# Patient Record
Sex: FEMALE | URBAN - METROPOLITAN AREA
[De-identification: names, ages, dates, MRNs, and addresses within clinical notes are randomized per-mention and may not be internally consistent; named-entity substitution may affect disease eponyms.]

---

## 2024-02-20 ENCOUNTER — LAB (OUTPATIENT)
Dept: URBAN - METROPOLITAN AREA MEDICAL CENTER 12 | Facility: MEDICAL CENTER | Age: 54
End: 2024-02-20
Payer: COMMERCIAL

## 2024-02-20 DIAGNOSIS — R10.84 ABDOMINAL CRAMPING, GENERALIZED: ICD-10-CM

## 2024-02-20 DIAGNOSIS — R11.2 ACUTE NAUSEA WITH NONBILIOUS VOMITING: ICD-10-CM

## 2024-02-20 PROCEDURE — 99254 IP/OBS CNSLTJ NEW/EST MOD 60: CPT | Performed by: INTERNAL MEDICINE

## 2024-02-20 PROCEDURE — 99204 OFFICE O/P NEW MOD 45 MIN: CPT | Performed by: INTERNAL MEDICINE

## 2024-02-21 ENCOUNTER — LAB (OUTPATIENT)
Dept: URBAN - METROPOLITAN AREA MEDICAL CENTER 12 | Facility: MEDICAL CENTER | Age: 54
End: 2024-02-21
Payer: COMMERCIAL

## 2024-02-21 DIAGNOSIS — R11.2 ACUTE NAUSEA WITH NONBILIOUS VOMITING: ICD-10-CM

## 2024-02-21 PROCEDURE — 43239 EGD BIOPSY SINGLE/MULTIPLE: CPT | Performed by: INTERNAL MEDICINE

## 2024-02-21 PROCEDURE — 43235 EGD DIAGNOSTIC BRUSH WASH: CPT | Performed by: INTERNAL MEDICINE

## 2024-03-22 ENCOUNTER — OV HOSPF/U (OUTPATIENT)
Dept: URBAN - METROPOLITAN AREA CLINIC 92 | Facility: CLINIC | Age: 54
End: 2024-03-22
Payer: COMMERCIAL

## 2024-03-22 ENCOUNTER — LAB (OUTPATIENT)
Dept: URBAN - METROPOLITAN AREA CLINIC 92 | Facility: CLINIC | Age: 54
End: 2024-03-22

## 2024-03-22 VITALS
WEIGHT: 275 LBS | TEMPERATURE: 97.3 F | HEART RATE: 81 BPM | SYSTOLIC BLOOD PRESSURE: 156 MMHG | HEIGHT: 67 IN | BODY MASS INDEX: 43.16 KG/M2 | DIASTOLIC BLOOD PRESSURE: 101 MMHG

## 2024-03-22 DIAGNOSIS — R10.13 EPIGASTRIC PAIN: ICD-10-CM

## 2024-03-22 DIAGNOSIS — R11.2 NAUSEA AND VOMITING IN ADULT: ICD-10-CM

## 2024-03-22 PROCEDURE — 99204 OFFICE O/P NEW MOD 45 MIN: CPT

## 2024-03-22 RX ORDER — HYDROCHLOROTHIAZIDE 25 MG/1
1 TABLET IN THE MORNING TABLET ORAL ONCE A DAY
Status: ACTIVE | COMMUNITY

## 2024-03-22 RX ORDER — METOPROLOL SUCCINATE 50 MG/1
1 TABLET TABLET, EXTENDED RELEASE ORAL ONCE A DAY
Status: ACTIVE | COMMUNITY

## 2024-03-22 RX ORDER — DICYCLOMINE HYDROCHLORIDE 20 MG/1
1 TABLET TABLET ORAL THREE TIMES A DAY
Qty: 90 | Refills: 0 | OUTPATIENT
Start: 2024-03-22 | End: 2024-04-21

## 2024-03-22 RX ORDER — DICYCLOMINE HYDROCHLORIDE 20 MG/1
1 TABLET TABLET ORAL THREE TIMES A DAY
Status: ACTIVE | COMMUNITY

## 2024-03-22 RX ORDER — TOPIRAMATE 25 MG/1
1 TABLET TABLET, FILM COATED ORAL ONCE A DAY
Status: ACTIVE | COMMUNITY

## 2024-03-22 RX ORDER — ONDANSETRON 8 MG/1
1 TABLET ON THE TONGUE AND ALLOW TO DISSOLVE  AS NEEDED TABLET, ORALLY DISINTEGRATING ORAL ONCE A DAY
Qty: 30 | OUTPATIENT
Start: 2024-03-22

## 2024-03-22 RX ORDER — ESCITALOPRAM OXALATE 20 MG/1
1 TABLET TABLET, FILM COATED ORAL ONCE A DAY
Status: ACTIVE | COMMUNITY

## 2024-03-22 RX ORDER — MUPIROCIN 20 MG/G
1 APPLICATION OINTMENT TOPICAL TWICE A DAY
Status: ACTIVE | COMMUNITY

## 2024-03-22 RX ORDER — FAMOTIDINE 40 MG/1
1 TABLET AT BEDTIME TABLET, FILM COATED ORAL ONCE A DAY
Status: ACTIVE | COMMUNITY

## 2024-03-22 RX ORDER — SUCRALFATE 1 G/1
1 TABLET ON AN EMPTY STOMACH TABLET ORAL TWICE A DAY
Status: ACTIVE | COMMUNITY

## 2024-03-22 RX ORDER — OMEPRAZOLE 40 MG/1
1 CAPSULE 30 MINUTES BEFORE MORNING MEAL CAPSULE, DELAYED RELEASE ORAL ONCE A DAY
Status: ACTIVE | COMMUNITY

## 2024-03-22 NOTE — PHYSICAL EXAM GASTROINTESTINAL
Abdomen,  soft, epigastric ttp, nondistended,  no guarding or rigidity,  no masses palpable,  normal bowel sounds Liver and Spleen,no hepatosplenomegaly

## 2024-03-22 NOTE — HPI-TODAY'S VISIT:
54-year-old patient presents today for hospital follow-up.  She has a history of GERD and hypertension presented to [facility] on 2024 due to abdominal pain and vomiting  Endoscopy 2024 demonstrated a 5 cm hiatal hernia biopsies demonstrated negative [facility] demonstrated stable LFTs, CBC, lipase Ultrasound demonstrated normal gallbladder increased echogenicity of the liver can be seen in hepatic steatosis  Currently states that her symptoms started after eating a bowl of honeybunches of oats with likely  milk.  Symptoms started with upper abdominal painand then she started to experience nausea and vomiting.  She presented to urgent care who provided her referral to GI and gave her dicyclomine.  She has been taking dicyclomine as needed which has been helping her abdominal pain.  She had a visit with digestive health care and was scheduled to have an endoscopy.  Symptoms worsened which is why she presented to [facility]. She continues to have epigastric abdominal pain and this can get worse with certain types of foods.  She has not found any particular foods that makes her symptoms worse.  She has been given multiple medications and is currently on pantoprazole 40 mg twice daily, famotidine 40 mg twice daily, Carafate before meals, dicyclomine 20 mg as needed, promethazine 25 mg as needed.  Zofran 4mg has not been helping her.  She is concerned since she started a new job and both dicyclomine and promethazine make her drowsy. She continues to have intermittent nausea and vomiting.  Her bowel movements are typically normal however, she can have occasional diarrhea.  No hematochezia or melena.  She denies any GERD symptoms, dysphagia, odynophagia, NSAID use.  She denies any weight loss. Colonoscopy on  mentioned redundant colon no other abnormalities No family history of GI related conditions or cancers. Of note she does have history of ankylosing spondylitis and is currently on Humira.

## 2024-03-26 ENCOUNTER — LAB (OUTPATIENT)
Dept: URBAN - METROPOLITAN AREA CLINIC 92 | Facility: CLINIC | Age: 54
End: 2024-03-26

## 2024-04-26 ENCOUNTER — OV EP (OUTPATIENT)
Dept: URBAN - METROPOLITAN AREA CLINIC 92 | Facility: CLINIC | Age: 54
End: 2024-04-26

## 2024-04-26 RX ORDER — MUPIROCIN 20 MG/G
1 APPLICATION OINTMENT TOPICAL TWICE A DAY
Status: ACTIVE | COMMUNITY

## 2024-04-26 RX ORDER — OMEPRAZOLE 40 MG/1
1 CAPSULE 30 MINUTES BEFORE MORNING MEAL CAPSULE, DELAYED RELEASE ORAL ONCE A DAY
Status: ACTIVE | COMMUNITY

## 2024-04-26 RX ORDER — TOPIRAMATE 25 MG/1
1 TABLET TABLET, FILM COATED ORAL ONCE A DAY
Status: ACTIVE | COMMUNITY

## 2024-04-26 RX ORDER — HYDROCHLOROTHIAZIDE 25 MG/1
1 TABLET IN THE MORNING TABLET ORAL ONCE A DAY
Status: ACTIVE | COMMUNITY

## 2024-04-26 RX ORDER — FAMOTIDINE 40 MG/1
1 TABLET AT BEDTIME TABLET, FILM COATED ORAL ONCE A DAY
Status: ACTIVE | COMMUNITY

## 2024-04-26 RX ORDER — ESCITALOPRAM OXALATE 20 MG/1
1 TABLET TABLET, FILM COATED ORAL ONCE A DAY
Status: ACTIVE | COMMUNITY

## 2024-04-26 RX ORDER — METOPROLOL SUCCINATE 50 MG/1
1 TABLET TABLET, EXTENDED RELEASE ORAL ONCE A DAY
Status: ACTIVE | COMMUNITY

## 2024-04-26 RX ORDER — ONDANSETRON 8 MG/1
1 TABLET ON THE TONGUE AND ALLOW TO DISSOLVE  AS NEEDED TABLET, ORALLY DISINTEGRATING ORAL ONCE A DAY
Qty: 30 | Status: ACTIVE | COMMUNITY
Start: 2024-03-22

## 2024-04-26 RX ORDER — SUCRALFATE 1 G/1
1 TABLET ON AN EMPTY STOMACH TABLET ORAL TWICE A DAY
Status: ACTIVE | COMMUNITY

## 2024-04-26 RX ORDER — ONDANSETRON 8 MG/1
1 TABLET ON THE TONGUE AND ALLOW TO DISSOLVE  AS NEEDED TABLET, ORALLY DISINTEGRATING ORAL ONCE A DAY
Qty: 30 | OUTPATIENT

## 2024-04-26 RX ORDER — DICYCLOMINE HYDROCHLORIDE 20 MG/1
1 TABLET TABLET ORAL THREE TIMES A DAY
Status: ACTIVE | COMMUNITY

## 2024-04-26 NOTE — HPI-TODAY'S VISIT:
54-year-old patient presents today for hospital follow-up.  She has a history of GERD and hypertension presented to [facility] on 2024 due to abdominal pain and vomiting  Endoscopy 2024 demonstrated a 5 cm hiatal hernia biopsies demonstrated negative [facility] demonstrated stable LFTs, CBC, lipase Ultrasound demonstrated normal gallbladder increased echogenicity of the liver can be seen in hepatic steatosis  Currently states that her symptoms started after eating a bowl of honeybunches of oats with likely  milk.  Symptoms started with upper abdominal painand then she started to experience nausea and vomiting.  She presented to urgent care who provided her referral to GI and gave her dicyclomine.  She has been taking dicyclomine as needed which has been helping her abdominal pain.  She had a visit with digestive health care and was scheduled to have an endoscopy.  Symptoms worsened which is why she presented to [facility]. She continues to have epigastric abdominal pain and this can get worse with certain types of foods.  She has not found any particular foods that makes her symptoms worse.  She has been given multiple medications and is currently on pantoprazole 40 mg twice daily, famotidine 40 mg twice daily, Carafate before meals, dicyclomine 20 mg as needed, promethazine 25 mg as needed.  Zofran 4mg has not been helping her.  She is concerned since she started a new job and both dicyclomine and promethazine make her drowsy. She continues to have intermittent nausea and vomiting.  Her bowel movements are typically normal however, she can have occasional diarrhea.  No hematochezia or melena.  She denies any GERD symptoms, dysphagia, odynophagia, NSAID use.  She denies any weight loss. Colonoscopy on  mentioned redundant colon no other abnormalities No family history of GI related conditions or cancers. Of note she does have history of ankylosing spondylitis and is currently on Humira.   CT 3/25/24: normal. GES 4/3/24: normal.

## 2025-08-27 ENCOUNTER — TELEPHONE ENCOUNTER (OUTPATIENT)
Dept: URBAN - METROPOLITAN AREA CLINIC 92 | Facility: CLINIC | Age: 55
End: 2025-08-27